# Patient Record
(demographics unavailable — no encounter records)

---

## 2017-05-18 NOTE — KCP[facility]
Subjective


Stated Complaint: RIGHT FOOT INJURY


History of Present Illness: 





Was walking barefoot outside last night and stepped on a sharp rock lacerating 

her right heel


She cleaned it thoroughly and bandaged it. Putting on aloe cream


No fever. Area not very tender. Wearing sandals and walking on toes





Past Medical History


Past Medical History: 





generally healthy


Smoking Status (MU): Never Smoked Tobacco


Household Exposure: No


Tobacco Cessation Information Provided: Yes


Weight: 146 lb


Vital Signs: 


 Vital Signs











  05/18/17





  18:04


 


Temperature 97.3 F


 


Pulse Rate 96


 


Respiratory 22





Rate 


 


Blood Pressure 124/60





(mmHg) 


 


O2 Sat by Pulse 100





Oximetry 











Home Medications: 


 Home Medications











 Medication  Instructions  Recorded  Confirmed  Type


 


Doxycycline (Rosacea) [Doxycycline] 1 tab PO BID 05/18/17 05/18/17 History


 


FLUoxetine CAP* 1 tab PO DAILY 05/18/17 05/18/17 History














Physical Exam


General Appearance: alert, comfortable


Hydration Status: mucous membranes moist, normal skin turgor


Head: normocephalic


Pupils: equal


Skin Description: 





1 1\2 cm V shaped laceration right heel. Clean, not inflamed. No signs of 

infection such as redness, tenderness, or swelling


Assessment: 





Laceration plantar area right foot-heel


Clean, dry, healing, no signs of infection


Plan: 





Keep area protected. Keep clean and dry. You can put antibiotic cream on it


If it gets red, swollen, painful, etc, should follow up

## 2017-06-19 NOTE — ED
Aury MARAVILLA Alok, scribed for Marcella Bhakta MD on 06/19/17 at 0035 .





Psychiatric Complaint





- HPI Summary


HPI Summary: 


15F presents to the ED with depression. Pt states she has been feeling more 

depressed lately due to recent stress. Pt presents with multiple abrasions on 

both arms and superficial laceration on left forearm all self-inflicted. Pt 

states she has cut herself in the past. Pt also notes ongoing HA today. Pt 

denies SI. PMHx includes h/o depression for which the pt takes anti-depressants 

for. Pt took her antidepressants PTA along with an ibuprofen.








- History Of Current Complaint


Chief Complaint: EDMentalHealth


Time Seen by Provider: 06/19/17 00:15


Hx Obtained From: Patient


Hx Last Menstrual Period: 4/18/17


Onset/Duration: Lasting Days, Still Present


Timing: Constant


Severity Initially: Moderate


Severity Currently: Moderate


Character: Depressed


Aggravating Factor(s): Recent Stress


Alleviating Factor(s): Nothing


Has Suicidal: Denies: Thoughts





- Allergies/Home Medications


Allergies/Adverse Reactions: 


 Allergies











Allergy/AdvReac Type Severity Reaction Status Date / Time


 


No Known Allergies Allergy   Verified 05/18/17 18:05














PMH/Surg Hx/FS Hx/Imm Hx


Endocrine/Hematology History: 


   Denies: Hx Diabetes


Cardiovascular History: 


   Denies: Hx Hypertension


Psychiatric History: Reports: Hx Depression


Infectious Disease History: No


Infectious Disease History: 


   Denies: Traveled Outside the [place] in Last 30 Days





- Family History


Known Family History: 


   Negative: Hypertension





- Social History


Occupation: Student


Lives: With Family


Alcohol Use: None


Substance Use Type: Reports: None


Smoking Status (MU): Never Smoked Tobacco





Review of Systems


Negative: Fever


Positive: Other - multiple abrasions on arms and laceration left forearm


Positive: Depressed


All Other Systems Reviewed And Are Negative: Yes





Physical Exam


Triage Information Reviewed: Yes


Vital Signs On Initial Exam: 


 Initial Vitals











Temp Pulse Resp BP Pulse Ox


 


 97.6 F   120   18   130/63   100 


 


 06/19/17 00:02  06/19/17 00:02  06/19/17 00:02  06/19/17 00:02  06/19/17 00:02











Vital Signs Reviewed: Yes


Appearance: Positive: Well-Appearing, No Pain Distress


Skin: Positive: Warm, Skin Color Reflects Adequate Perfusion, Dry, Other - 

Multiple linear abrasions and 4cm superfical laceration left forearm


Eyes: Positive: EOMI, JUMA


ENT: Positive: Pharynx normal, TMs normal


Neck: Positive: Supple, Nontender


Respiratory/Lung Sounds: Positive: Clear to Auscultation, Breath Sounds 

Present.  Negative: Rales, Rhonchi, Wheezes


Cardiovascular: Positive: RRR, Other - no gallop.  Negative: Murmur, Rub


Abdomen Description: Positive: Nontender, Soft, Other: - no rebound.  Negative: 

Distended, Guarding


Bowel Sounds: Positive: Present


Musculoskeletal: Positive: Strength/ROM Intact.  Negative: Edema Left, Edema 

Right


Neurological: Positive: Sensory/Motor Intact, Alert, Oriented to Person Place, 

Time, CN Intact II-III


Psychiatric: Positive: Affect/Mood Appropriate





Diagnostics





- Vital Signs


 Vital Signs











  Temp Pulse Resp BP Pulse Ox


 


 06/19/17 00:02  97.6 F  120  18  130/63  100














- Laboratory


Lab Results: 


 Lab Results











  06/19/17 06/19/17 06/19/17 Range/Units





  00:34 00:34 01:15 


 


WBC  8.2    (3.5-10.8)  10^3/ul


 


RBC  4.65    (4.0-5.4)  10^6/ul


 


Hgb  13.9    (12.0-16.0)  g/dl


 


Hct  40    (35-47)  %


 


MCV  87    (80-97)  fL


 


MCH  30    (27-31)  pg


 


MCHC  34    (31-36)  g/dl


 


RDW  14    (10.5-15)  %


 


Plt Count  268    (150-450)  10^3/ul


 


MPV  7 L    (7.4-10.4)  um3


 


Neut % (Auto)  62.4    (38-83)  %


 


Lymph % (Auto)  27.7    (25-47)  %


 


Mono % (Auto)  7.7    (1-9)  %


 


Eos % (Auto)  1.5    (0-6)  %


 


Baso % (Auto)  0.7    (0-2)  %


 


Absolute Neuts (auto)  5.1    (1.5-7.7)  10^3/ul


 


Absolute Lymphs (auto)  2.3    (1.0-4.8)  10^3/ul


 


Absolute Monos (auto)  0.6    (0-0.8)  10^3/ul


 


Absolute Eos (auto)  0.1    (0-0.6)  10^3/ul


 


Absolute Basos (auto)  0.1    (0-0.2)  10^3/ul


 


Absolute Nucleated RBC  0    10^3/ul


 


Nucleated RBC %  0    


 


Sodium   135   (133-145)  mmol/L


 


Potassium   3.5   (3.5-5.0)  mmol/L


 


Chloride   107   (101-111)  mmol/L


 


Carbon Dioxide   24   (22-32)  mmol/L


 


Anion Gap   4   (2-11)  mmol/L


 


BUN   9   (6-24)  mg/dL


 


Creatinine   0.56   (0.51-0.95)  mg/dL


 


BUN/Creatinine Ratio   16.1   (8-20)  


 


Glucose   101 H   ()  mg/dL


 


Calcium   9.4   (8.6-10.3)  mg/dL


 


Total Bilirubin   0.50   (0.2-1.0)  mg/dL


 


AST   18   (13-39)  U/L


 


ALT   15   (7-52)  U/L


 


Alkaline Phosphatase   85   ()  U/L


 


Total Protein   7.0   (6.4-8.9)  g/dL


 


Albumin   4.3   (3.2-5.2)  g/dL


 


Globulin   2.7   (2-4)  g/dL


 


Albumin/Globulin Ratio   1.6   (1-3)  


 


TSH   1.27   (0.34-5.60)  mcIU/mL


 


Urine Color    Straw  


 


Urine Appearance    Clear  


 


Urine pH    7.0  (5-9)  


 


Ur Specific Gravity    1.005 L  (1.010-1.030)  


 


Urine Protein    Negative  (Negative)  


 


Urine Ketones    Negative  (Negative)  


 


Urine Blood    2+ H  (Negative)  


 


Urine Nitrate    Negative  (Negative)  


 


Urine Bilirubin    Negative  (Negative)  


 


Urine Urobilinogen    Negative  (Negative)  


 


Ur Leukocyte Esterase    Negative  (Negative)  


 


Urine WBC (Auto)    Absent  (Absent)  


 


Urine RBC (Auto)    Trace(0-2/hpf)  (Absent)  


 


Ur Squamous Epith Cells    Present H  (Absent)  


 


Urine Bacteria    Absent  (Absent)  


 


Urine Glucose    Negative  (Negative)  


 


Salicylates   < 2.50   (<30)  mg/dL


 


Acetaminophen   < 15   mcg/mL


 


Serum Alcohol   < 10   (<10)  mg/dL











Result Diagrams: 


 06/19/17 00:34





 06/19/17 00:34


Lab Statement: Any lab studies that have been ordered have been reviewed, and 

results considered in the medical decision making process.





Course/Dx





- Course


Course Of Treatment: 15 yo with cutting seen by mhu (one wound was 

steristripped by the PA) and discharged home in stable condition





- Differential Dx/Clinical Impression


Provider Diagnosis: 


 Self mutilating behavior








Discharge





- Discharge Plan


Condition: Stable


Disposition: HOME


Referrals: 


Oanh Villalpando MD [Primary Care Provider] - 


Additional Instructions: 


Per completion of a mental health evaluation, you are cleared for release to 

the care of [facility] and do not require inpatient psychiatric 

hospitalization at this time.  Please go to nearest emergency room or call 911 

if safety concerns arise or condition worsens.





Important Phone Numbers:





[facility]   ph:[phone]


[facility]      ph:[phone]


National Suicide Prevention Lifeline      ph:804-238-GCGC (7711)


[facility]      ph:[phone]


Alcoholics Anonymous         ph:[phone]


[facility]      ph:[phone]


[employer] [place] [employer]         ph:[phone]








The documentation as recorded by the Aury agee Alok accurately reflects 

the service I personally performed and the decisions made by me, Marcella Bhakta MD.